# Patient Record
Sex: FEMALE | Race: WHITE | NOT HISPANIC OR LATINO | URBAN - METROPOLITAN AREA
[De-identification: names, ages, dates, MRNs, and addresses within clinical notes are randomized per-mention and may not be internally consistent; named-entity substitution may affect disease eponyms.]

---

## 2018-05-24 ENCOUNTER — EMERGENCY (EMERGENCY)
Facility: HOSPITAL | Age: 83
LOS: 1 days | Discharge: HOME | End: 2018-05-24
Attending: EMERGENCY MEDICINE | Admitting: EMERGENCY MEDICINE
Payer: MEDICARE

## 2018-05-24 VITALS
HEART RATE: 73 BPM | DIASTOLIC BLOOD PRESSURE: 71 MMHG | TEMPERATURE: 98 F | SYSTOLIC BLOOD PRESSURE: 139 MMHG | RESPIRATION RATE: 16 BRPM | OXYGEN SATURATION: 98 %

## 2018-05-24 DIAGNOSIS — Z90.49 ACQUIRED ABSENCE OF OTHER SPECIFIED PARTS OF DIGESTIVE TRACT: Chronic | ICD-10-CM

## 2018-05-24 PROCEDURE — 72100 X-RAY EXAM L-S SPINE 2/3 VWS: CPT | Mod: 26

## 2018-05-24 PROCEDURE — 99283 EMERGENCY DEPT VISIT LOW MDM: CPT | Mod: GC

## 2018-05-24 RX ORDER — IBUPROFEN 200 MG
600 TABLET ORAL ONCE
Qty: 0 | Refills: 0 | Status: COMPLETED | OUTPATIENT
Start: 2018-05-24 | End: 2018-05-24

## 2018-05-24 RX ORDER — OXYCODONE AND ACETAMINOPHEN 5; 325 MG/1; MG/1
1 TABLET ORAL ONCE
Qty: 0 | Refills: 0 | Status: DISCONTINUED | OUTPATIENT
Start: 2018-05-24 | End: 2018-05-24

## 2018-05-24 RX ORDER — LIDOCAINE 4 G/100G
1 CREAM TOPICAL ONCE
Qty: 0 | Refills: 0 | Status: COMPLETED | OUTPATIENT
Start: 2018-05-24 | End: 2018-05-24

## 2018-05-24 RX ADMIN — OXYCODONE AND ACETAMINOPHEN 1 TABLET(S): 5; 325 TABLET ORAL at 23:42

## 2018-05-24 RX ADMIN — LIDOCAINE 1 PATCH: 4 CREAM TOPICAL at 22:01

## 2018-05-24 RX ADMIN — Medication 600 MILLIGRAM(S): at 22:00

## 2018-05-24 RX ADMIN — Medication 600 MILLIGRAM(S): at 22:30

## 2018-05-24 NOTE — ED PROVIDER NOTE - OBJECTIVE STATEMENT
82F h/o HLD, hypothyroid presenting with back pain. Notes L low back pain, aching, radiating to L knee, started 4 days ago, worsening, after standing for long period of time the previous day. Denies F, CP/SOb, weakness, numbness, no urinary incontinence. 82F h/o HLD, hypothyroid presenting with back pain. Notes L low back pain, aching, radiating to L knee, started 4 days ago, worsening, after standing for long period of time the previous day. Denies F, CP/SOb, weakness, numbness, urinary/bowel incontinence, trauma, fall, IVDU. 82F h/o HLD, hypothyroid presenting with back pain. Notes L low back pain, aching, radiating to L hip and knee, started 4 days ago, worsening, after standing for long period of time the previous day. Denies F, CP/SOb, weakness, numbness, urinary/bowel incontinence, trauma, fall, IVDU.  Endorses severe left thigh spasms as well.  These are new Sx for her. No urinary or bowel complaints. NO numbness/weakness.

## 2018-05-24 NOTE — ED ADULT TRIAGE NOTE - CHIEF COMPLAINT QUOTE
pt arrives via wheelchair, c/o lower back pain with radiation to right leg x2 days, denies recent trauma or falls, pain worsening with movement- pt unable to bare full wait on leg due to pain, taking Advil at home with no relief- denies any urinary symptoms

## 2018-05-24 NOTE — ED PROVIDER NOTE - MEDICAL DECISION MAKING DETAILS
82F w/ above history p/w L lower back pain radiating to L knee, worse with movement, bending, worsening over past 4 days, no focal neuro symptoms, fever, incontinence, concerning for MSK injury  -pain control, reassess 82F w/ above history p/w L lower back pain radiating to L knee, worse with movement, bending, worsening over past 4 days, no focal neuro symptoms, fever, incontinence, no specific evpidence of injury.  Will XR L spine and left hip, medicate, reassess.

## 2018-05-24 NOTE — ED PROVIDER NOTE - PROGRESS NOTE DETAILS
Pain minimally improved, now with severe spasm of left thigh.   Able to stand, but cannot walk d/t pain  Pt is adamantly declining steroid since her daughter had an apparent panic attack or paranoia while taking prednisone and since she also has a h/o anxiety is concerned about recurrence with prednisone.

## 2018-05-24 NOTE — ED PROVIDER NOTE - ATTENDING CONTRIBUTION TO CARE
Attending Attestation: Dr. Sanchez  I have personally performed a history and physical examination of the patient and discussed management with the resident as well as the patient.  I reviewed the resident's note and agree with the documented findings and plan of care.  I have authored and modified critical sections of the Provider Note, including but not limited to HPI, Physical Exam and MDM. 82F w/ above history p/w L lower back pain radiating to L knee, worse with movement, bending, worsening over past 4 days, no focal neuro symptoms, fever, incontinence, no specific evpidence of injury.  Will XR L spine and left hip, medicate, reassess.

## 2018-05-24 NOTE — ED ADULT NURSE NOTE - OBJECTIVE STATEMENT
float rn: pt a&o x3 c/o left back pain since saturday, now radiating down left leg. pt states it started when she was standing for a long time. unable to bear weight on extremity. no c/o chest pain, sob, trauma/injury, fevers or urinary symptoms. nad noted, safety maintained. endorsed to rn sarai.

## 2018-05-24 NOTE — ED PROVIDER NOTE - PHYSICAL EXAMINATION
Left greater troch tenderness  Negative straight leg raise BL  No sensory deficits.  5/5 strength at lower extremities  Exquisite tenderness at left sciatic notch

## 2018-05-25 VITALS
RESPIRATION RATE: 17 BRPM | DIASTOLIC BLOOD PRESSURE: 88 MMHG | SYSTOLIC BLOOD PRESSURE: 131 MMHG | HEART RATE: 80 BPM | OXYGEN SATURATION: 100 %

## 2018-05-25 RX ORDER — DIAZEPAM 5 MG
1 TABLET ORAL
Qty: 4 | Refills: 0 | OUTPATIENT
Start: 2018-05-25

## 2018-05-25 RX ADMIN — OXYCODONE AND ACETAMINOPHEN 1 TABLET(S): 5; 325 TABLET ORAL at 00:12
